# Patient Record
Sex: FEMALE | Race: WHITE | NOT HISPANIC OR LATINO | Employment: FULL TIME | URBAN - METROPOLITAN AREA
[De-identification: names, ages, dates, MRNs, and addresses within clinical notes are randomized per-mention and may not be internally consistent; named-entity substitution may affect disease eponyms.]

---

## 2024-05-06 ENCOUNTER — ULTRASOUND (OUTPATIENT)
Dept: OBGYN CLINIC | Facility: CLINIC | Age: 34
End: 2024-05-06
Payer: COMMERCIAL

## 2024-05-06 ENCOUNTER — NURSE TRIAGE (OUTPATIENT)
Age: 34
End: 2024-05-06

## 2024-05-06 ENCOUNTER — HOSPITAL ENCOUNTER (OUTPATIENT)
Dept: ULTRASOUND IMAGING | Facility: HOSPITAL | Age: 34
Discharge: HOME | End: 2024-05-06
Attending: OBSTETRICS & GYNECOLOGY
Payer: COMMERCIAL

## 2024-05-06 ENCOUNTER — APPOINTMENT (OUTPATIENT)
Dept: LAB | Facility: HOSPITAL | Age: 34
End: 2024-05-06
Attending: OBSTETRICS & GYNECOLOGY
Payer: COMMERCIAL

## 2024-05-06 VITALS — WEIGHT: 204.4 LBS | DIASTOLIC BLOOD PRESSURE: 78 MMHG | SYSTOLIC BLOOD PRESSURE: 102 MMHG

## 2024-05-06 DIAGNOSIS — O46.90 VAGINAL BLEEDING IN PREGNANCY: ICD-10-CM

## 2024-05-06 DIAGNOSIS — O46.90 VAGINAL BLEEDING IN PREGNANCY: Primary | ICD-10-CM

## 2024-05-06 DIAGNOSIS — O46.90 ANTEPARTUM HEMORRHAGE: ICD-10-CM

## 2024-05-06 PROBLEM — O20.9 VAGINAL BLEEDING IN PREGNANCY, FIRST TRIMESTER: Status: ACTIVE | Noted: 2023-08-25

## 2024-05-06 LAB
ABO GROUP BLD: NORMAL
B-HCG SERPL-ACNC: 1192.8 MIU/ML (ref 0–5)
BASOPHILS # BLD AUTO: 0.04 THOUSANDS/ÂΜL (ref 0–0.1)
BASOPHILS NFR BLD AUTO: 0 % (ref 0–1)
BLD GP AB SCN SERPL QL: NEGATIVE
EOSINOPHIL # BLD AUTO: 0.19 THOUSAND/ÂΜL (ref 0–0.61)
EOSINOPHIL NFR BLD AUTO: 1 % (ref 0–6)
ERYTHROCYTE [DISTWIDTH] IN BLOOD BY AUTOMATED COUNT: 11.8 % (ref 11.6–15.1)
HCT VFR BLD AUTO: 39.3 % (ref 34.8–46.1)
HGB BLD-MCNC: 12.9 G/DL (ref 11.5–15.4)
IMM GRANULOCYTES # BLD AUTO: 0.06 THOUSAND/UL (ref 0–0.2)
IMM GRANULOCYTES NFR BLD AUTO: 0 % (ref 0–2)
LYMPHOCYTES # BLD AUTO: 3.1 THOUSANDS/ÂΜL (ref 0.6–4.47)
LYMPHOCYTES NFR BLD AUTO: 23 % (ref 14–44)
MCH RBC QN AUTO: 29.7 PG (ref 26.8–34.3)
MCHC RBC AUTO-ENTMCNC: 32.8 G/DL (ref 31.4–37.4)
MCV RBC AUTO: 90 FL (ref 82–98)
MONOCYTES # BLD AUTO: 0.85 THOUSAND/ÂΜL (ref 0.17–1.22)
MONOCYTES NFR BLD AUTO: 6 % (ref 4–12)
NEUTROPHILS # BLD AUTO: 9.37 THOUSANDS/ÂΜL (ref 1.85–7.62)
NEUTS SEG NFR BLD AUTO: 70 % (ref 43–75)
NRBC BLD AUTO-RTO: 0 /100 WBCS
PLATELET # BLD AUTO: 429 THOUSANDS/UL (ref 149–390)
PMV BLD AUTO: 8.9 FL (ref 8.9–12.7)
RBC # BLD AUTO: 4.35 MILLION/UL (ref 3.81–5.12)
RH BLD: POSITIVE
SPECIMEN EXPIRATION DATE: NORMAL
WBC # BLD AUTO: 13.61 THOUSAND/UL (ref 4.31–10.16)

## 2024-05-06 PROCEDURE — 86901 BLOOD TYPING SEROLOGIC RH(D): CPT

## 2024-05-06 PROCEDURE — 36415 COLL VENOUS BLD VENIPUNCTURE: CPT

## 2024-05-06 PROCEDURE — 86850 RBC ANTIBODY SCREEN: CPT

## 2024-05-06 PROCEDURE — 84702 CHORIONIC GONADOTROPIN TEST: CPT

## 2024-05-06 PROCEDURE — 85025 COMPLETE CBC W/AUTO DIFF WBC: CPT

## 2024-05-06 PROCEDURE — 76801 OB US < 14 WKS SINGLE FETUS: CPT

## 2024-05-06 PROCEDURE — 86900 BLOOD TYPING SEROLOGIC ABO: CPT

## 2024-05-06 NOTE — PROGRESS NOTES
Subjective  Patient ID: Genia Gr is a 33 y.o. female here for Pregnancy Ultrasound    Newly Pregnant  Patient's last menstrual period was 2024. giving her an HARSH of 24 and a gestational age of  7 weeks 2 days (based on LMP)    Menstrual cycle: irregular   Pregnancy was planned.   She has started taking a prenatal vitamin    She is C/O amenorrhea  Signs and symptoms of pregnancy:   Breast tenderness: yes  Fatigue: no  Cramping or Pelvic Pain: yes  Spotting or Vaginal Bleeding: yes, since . Blood type on file in care everywhere O+  Nausea or vomiting: yes    OB History    Para Term  AB Living   1       1     SAB IAB Ectopic Multiple Live Births   1              # Outcome Date GA Lbr Aaron/2nd Weight Sex Delivery Anes PTL Lv   1 SAB 23                The following portions of the patient's history were reviewed and updated as appropriate: allergies, current medications, past family history, past medical history, past social history, past surgical history, and problem list.    Perinent hx that may affect pregnancy:        Review of Systems    See HPI for pertinent positives.             Wt 92.7 kg (204 lb 6.4 oz)   LMP 2024   OBGyn Exam  No results found for this or any previous visit.    FIRST TRIMESTER OBSTETRIC ULTRASOUND     Patient's last menstrual period was 2024.    INDICATION: Establish Gestational Age       FINDINGS:  See imaging report for details     Additional Findings: none     FHR: absent  IMPRESSION:    GS with questionable yolk sac precent  GS measuring 5w 0d  No fetal pole present  No adnexal masses seen.  EDC by LMP: 24      Assigning a Final HARSH  N/A    Pregnancy of unknown location and viability.  Small questionable yolk sac visualized, will check formal scan  Check beta hcg, cbc and type and screen    Una De La Paz DO  [facility] [address]  [address]  [place]  [address]  Dept: [phone]  Dept Fax: [phone]  Ultrasound Probe Disinfection    A transvaginal ultrasound was performed.   Prior to use, disinfection was performed with High Level Disinfection Process ([employer]).  Probe serial number: [id number] was used.          Assessment/Plan:    PUL vs failing early pregnancy, ?yolk sac visualized on ultrasound today  Check stat formal pelvic u/s  Check beta hcg, cbc and type and screen

## 2024-05-06 NOTE — TELEPHONE ENCOUNTER
"Patient called states LMP 3/16 and has been experiencing vaginal bleeding (when wiping) since Wednesday 5/1. Patient states started as brown and has changed to pinkish-red within last 24 hours. Denies any abdominal pain, heavy bleeding or passing any large clots. States is passing some stringy tissue, but denies any clots. States had intercourse yesterday and denies recent exercise. ABO/RH not on file. Scheduled for D/V scan 5/20. Advised will reach out to provider on call. Care advice reviewed. Patient aware will receive call back. Patient verbalized understanding. No further questions at this time.     TT sent to on call provider. Awaiting recommendation.     Reason for Disposition   SPOTTING lasting > 48 hours or spotting happens more than once in a week    Answer Assessment - Initial Assessment Questions  1. ONSET: \"When did this bleeding start?\"        Last wednesday  2. DESCRIPTION: \"Describe the bleeding that you are having.\" \"How much bleeding is there?\"     - SPOTTING: spotting, or pinkish / brownish mucous discharge; does not fill panti-liner or pad     - MILD:  less than 1 pad / hour; less than patient's usual menstrual bleeding    - MODERATE: 1-2 pads / hour; 1 menstrual cup every 6 hours; small-medium blood clots (e.g., pea, grape, small coin)    - SEVERE: soaking 2 or more pads/hour for 2 or more hours; 1 menstrual cup every 2 hours; bleeding not contained by pads or continuous red blood from vagina; large blood clots (e.g., golf ball, large coin)       Only when wiping and stringy clots - started out brown and pinker-bright red last 24 hours   3. ABDOMINAL PAIN SEVERITY: If present, ask: \"How bad is it?\"  (e.g., Scale 1-10; mild, moderate, or severe)    - MILD (1-3): doesn't interfere with normal activities, abdomen soft and not tender to touch     - MODERATE (4-7): interferes with normal activities or awakens from sleep, tender to touch     - SEVERE (8-10): excruciating pain, doubled over, unable to " "do any normal activities      denies  4. PREGNANCY: \"Do you know how many weeks or months pregnant you are?\" \"When was the first day of your last normal menstrual period?\"      LMP 3/16  5. HEMODYNAMIC STATUS: \"Are you weak or feeling lightheaded?\" If Yes, ask: \"Can you stand and walk normally?\"       denies  6. OTHER SYMPTOMS: \"What other symptoms are you having with the bleeding?\" (e.g., passed tissue, vaginal discharge, fever, menstrual-type cramps)      denies    Protocols used: Pregnancy - Vaginal Bleeding Less Than 20 Weeks EGA-ADULT-[place]    "

## 2024-05-06 NOTE — PROGRESS NOTES
Patient here for early US.  LMP: 3/16/24  7w/2d  HARSH: 24  ; 1 miscarriage  Planned: yes  Periods: regular  Symptoms: spotting since 24, cramping, nausea, breast tenderness  Blood type: O positive  She is accompanied by: Adithya

## 2024-05-06 NOTE — TELEPHONE ENCOUNTER
Per Dr. Morton - Would see if she can be seen in office for scan today should be 7+2    Called patient back to advise of provider's recommendation. Warm transferred call to office staff to schedule patient for scan today. Patient verbalized understanding. No further questions at this time.

## 2024-05-07 DIAGNOSIS — O36.80X0 PREGNANCY OF UNKNOWN ANATOMIC LOCATION: ICD-10-CM

## 2024-05-07 DIAGNOSIS — O46.90 VAGINAL BLEEDING IN PREGNANCY: Primary | ICD-10-CM

## 2024-05-08 ENCOUNTER — NURSE TRIAGE (OUTPATIENT)
Age: 34
End: 2024-05-08

## 2024-05-08 ENCOUNTER — APPOINTMENT (OUTPATIENT)
Dept: LAB | Facility: HOSPITAL | Age: 34
End: 2024-05-08
Payer: COMMERCIAL

## 2024-05-08 DIAGNOSIS — O36.80X0 PREGNANCY OF UNKNOWN ANATOMIC LOCATION: Primary | ICD-10-CM

## 2024-05-08 LAB — B-HCG SERPL-ACNC: 197.5 MIU/ML (ref 0–5)

## 2024-05-08 PROCEDURE — 36415 COLL VENOUS BLD VENIPUNCTURE: CPT

## 2024-05-08 PROCEDURE — 84702 CHORIONIC GONADOTROPIN TEST: CPT

## 2024-05-08 NOTE — TELEPHONE ENCOUNTER
Reason for Disposition   Nursing judgment    Answer Assessment - Initial Assessment Questions  1. REASON FOR CALL or QUESTION:   Genia called for follow up recommendation to decreasing HCG result.  Advised will forward to Dr. De La Paz for review.     She will be going out of town Friday through Tuesday.    Protocols used: Information Only Call - No Triage-ADULT-[place]

## 2024-05-10 ENCOUNTER — APPOINTMENT (OUTPATIENT)
Dept: LAB | Facility: HOSPITAL | Age: 34
End: 2024-05-10
Payer: COMMERCIAL

## 2024-05-10 DIAGNOSIS — O36.80X0 PREGNANCY OF UNKNOWN ANATOMIC LOCATION: ICD-10-CM

## 2024-05-10 LAB — B-HCG SERPL-ACNC: 53.7 MIU/ML (ref 0–5)

## 2024-05-10 PROCEDURE — 84702 CHORIONIC GONADOTROPIN TEST: CPT

## 2024-05-10 PROCEDURE — 36415 COLL VENOUS BLD VENIPUNCTURE: CPT

## 2024-05-20 ENCOUNTER — ULTRASOUND (OUTPATIENT)
Dept: OBGYN CLINIC | Facility: CLINIC | Age: 34
End: 2024-05-20
Payer: COMMERCIAL

## 2024-05-20 ENCOUNTER — APPOINTMENT (OUTPATIENT)
Dept: LAB | Facility: HOSPITAL | Age: 34
End: 2024-05-20
Payer: COMMERCIAL

## 2024-05-20 VITALS — WEIGHT: 202 LBS | SYSTOLIC BLOOD PRESSURE: 106 MMHG | DIASTOLIC BLOOD PRESSURE: 78 MMHG

## 2024-05-20 DIAGNOSIS — O03.9 SPONTANEOUS MISCARRIAGE: Primary | ICD-10-CM

## 2024-05-20 DIAGNOSIS — O36.80X0 PREGNANCY OF UNKNOWN ANATOMIC LOCATION: ICD-10-CM

## 2024-05-20 LAB — B-HCG SERPL-ACNC: 2.4 MIU/ML (ref 0–5)

## 2024-05-20 PROCEDURE — 99213 OFFICE O/P EST LOW 20 MIN: CPT | Performed by: OBSTETRICS & GYNECOLOGY

## 2024-05-20 PROCEDURE — 36415 COLL VENOUS BLD VENIPUNCTURE: CPT

## 2024-05-20 PROCEDURE — 84702 CHORIONIC GONADOTROPIN TEST: CPT

## 2024-05-20 NOTE — ASSESSMENT & PLAN NOTE
Pt with bleeding and cramping that has now resolved- beta hcg downtrended on last check to 53.   Discussed with patient and partner highly suggestive of SAB.   Recommend repeating beta hcg and trending to zero, she plans to repeat this today.  Reviewed return to normal cycle and trying again for pregnancy. Will call with +UPT for bloodwork